# Patient Record
(demographics unavailable — no encounter records)

---

## 2025-05-05 NOTE — HISTORY OF PRESENT ILLNESS
[FreeTextEntry1] : Peter Espinoza, a 23-year-old patient, reports experiencing significant sleep disturbances for the past five to six months. He describes difficulty falling asleep, often taking one to two hours to do so. Once asleep, he wakes up after one to two hours and finds it challenging to return to sleep. These awakenings are accompanied by sensations of increased energy, preventing him from going back to sleep. He also notes frequent nocturnal awakenings where he feels his brain is awake, though not fully conscious, allowing him to return to sleep quickly. During the day, he experiences excessive daytime sleepiness, impacting his ability to nap effectively.  The patient reports snoring, though not very loud, and occasional hypnic jerks that occur almost every time he falls asleep and intermittently throughout the night. He also experiences micro-awakenings, described as moments of partial consciousness during sleep. Additionally, he has noted unplanned leg sensations at night and instances of muscle weakness when laughing or feeling excited, particularly when fatigued. He describes occasional parasomnias, including one incident where he acted out a dream by rolling out of bed.  Prior sleep medicine workup includes a home sleep study conducted on April 10, 2025, which was reported as normal, showing an apnea-hypopnea index of 2.5 and a lowest oxygen saturation of 88%. Despite normal findings, the patient continues to experience significant sleep disturbances.  Sleep Schedule: - Time to bed: Between 10 p.m. to 12 a.m. on weekends - Time to fall asleep: 1 to 2 hours - Nocturnal awakenings: Frequent - Wake up time: 6:30 a.m. to 7:30 a.m., and around 10 a.m. on weekends - Daytime sleepiness: Present  Relevant Medications: - Vyvanse (Lisdexamfetamine) for ADHD - Adderall (Amphetamine/Dextroamphetamine) for ADHD - Keppra (Levetiracetam) for seizure disorder - Fluoxetine for mental health issues - Gabapentin, initially used for anti-addiction, now for sleep aid  Past Medical History: - Scoliosis - Seizure disorder with two seizures reported in January 2022 and March/April 2023 - ADHD  Social History: The patient reports smoking cigarettes, about a quarter of a pack per day, having started smoking cigarettes at age 20 after initially vaping at 15 or 16 years old. He quit marijuana three years ago.  Review of Systems: - Neurological: History of seizures, hypnic jerks - Respiratory: Snoring, no excessive cough or dyspnea - Musculoskeletal: Occasional muscle weakness when laughing or excited - Psychiatric: Anxiety associated with sleep, ADHD - Rest of the review of systems reports not having significant issues.  Relevant Data: - April 10, 2025: Home sleep study with total sleep time of 426 minutes out of 486 minutes, AHI of 2.5, and lowest oxygen saturation of 88%. - Pulmonary function tests: FEV1 81% predicted, FEV1/FVC 99% predicted, TLC 79% predicted, DLCO 74%, corrected 99% predicted. No significant bronchodilator response. Interpretation: Essentially normal PFT

## 2025-05-05 NOTE — REASON FOR VISIT
[Consultation] : a consultation visit [FreeTextEntry1] : PCP: Mari Muhammad MD [FreeTextEntry2] : sleep disturbance

## 2025-05-05 NOTE — PLAN
[TextEntry] : The patient is a 23-year-old male with a past medical history of seizure disorder, ADHD, and scoliosis, presenting for evaluation of sleep disturbances.  - Insomnia and Sleep Disturbance: Likely multifactorial - role of medications (Vyvanse, Adderall, Gabapentin) underlying anxiety, & psychophysiologic insomnia. Discussed cognitive behavioral therapy (CBT) for insomnia. Recommended reducing stimulant medication use in the evening and emphasized psychotherapy over medication for sleep improvement. - Referral to psychotherapist, specifically for CBT for insomnia. - Consideration of in-person psychotherapy or CBTI  juan [facility].   - Hypnic jerks - normal variant. avoid sleep deprivation  - Obstructive Sleep Apnea: Despite normal home sleep study, symptoms suggest risk for sleep apnea. Advised attended sleep study for further evaluation.  - Smoking: Patient encouraged to quit smoking with resources provided for support. counselling done 6 minutes.   Follow-up: Follow up in 1 to 2 months after attended sleep study and initiation of psychotherapy.

## 2025-05-31 NOTE — HISTORY OF PRESENT ILLNESS
[FreeTextEntry1] : 23 year old male comes in because he injured his toe five days ago on right great toe. new patient pain 8/10  constant  radiates into other toes and down his foot  pins and needles  and a tearing pain  swollen and red  when he takes showers his right foot gets swollen red and hot  he had spinal surgery in 2019 states he noted occasional numbness tingling RLE after

## 2025-05-31 NOTE — PHYSICAL EXAM
[General Appearance - Alert] : alert [General Appearance - In No Acute Distress] : in no acute distress [2+] : left foot dorsalis pedis 2+ [] : normal strength/tone [Sensation] : the sensory exam was normal to light touch and pinprick [Oriented To Time, Place, And Person] : oriented to person, place, and time [Impaired Insight] : insight and judgment were intact [de-identified] : Localized erythema edema increased warm to right hallux IPJ. there is tenderness on range of motion of the IPJ. There is tenderness to palpation of interphalangeal joint of right hallux +Tinnel sign

## 2025-05-31 NOTE — PHYSICAL EXAM
[General Appearance - Alert] : alert [General Appearance - In No Acute Distress] : in no acute distress [2+] : left foot dorsalis pedis 2+ [] : normal strength/tone [Oriented To Time, Place, And Person] : oriented to person, place, and time [Sensation] : the sensory exam was normal to light touch and pinprick [Impaired Insight] : insight and judgment were intact [de-identified] : Localized erythema edema increased warm to right hallux IPJ. there is tenderness on range of motion of the IPJ. There is tenderness to palpation of interphalangeal joint of right hallux +Tinnel sign

## 2025-05-31 NOTE — ASSESSMENT
[FreeTextEntry1] : Patient seen and examined Has two complaints radiating foot pain as well as isolated pain to right IPJ Right foot xrays obtained: reveal questionable fracture distal phalanx of right hallux distal tuft versus old fracture Discussed differential diagnosis such as gout flare, bursitis, fracture  After further discussion it was agreed to give the patient an injection to reduce inflammation and associated pain and to improve function. The area to be injected was wiped thoroughly with sterile alcohol. Then using a combination of 1.0 cc of Celestone acetate suspension combined with 1 cc of Dexamethasone sodium phosphate and 1.0 cc of Lidocaine plain, an injection was given at the right hallux IPJ Sterile gauze was used for compression and then a sterile Band-Aid was applied to the injection site. The possibility of a flair reaction was discussed with the patient prior to administering the injection Discussed treatment for nerve type pain On gabapentin 300mg advised to continue discussed risks/benefits Discussed re-evaluation from orthospine possible back etiology We discussed possible posterior tibial nerve block -> defer Continue supportive shoegear Consider  He is to follow up in 2-3 weeks for re-evaluation

## 2025-06-11 NOTE — HISTORY OF PRESENT ILLNESS
[FreeTextEntry1] : 23-year-old M presents for a follow up visit of his right great toe, pt states it's doing better but continues to cause him pain at times. Flare ups occur more often in the mornings and continue throughout the rest of the day. Pain is 6/10. Pt states it continues to be swollen/ red. Pt also states pain radiates from great toe to the middle of his foot.  Pt has been elevating foot.

## 2025-06-11 NOTE — ASSESSMENT
[FreeTextEntry1] : Patient seen and examined Right IPJ localized erythema pain at IPJ Right foot xrays obtained: reveal questionable osteochondroma distal tuft medial aspect and questionable old fracture  Discussed differential diagnosis such as gout flare, bursitis, fracture  s/p 1 injection partial relief  After further discussion it was agreed to give the patient an injection to reduce inflammation and associated pain and to improve function. The area to be injected was wiped thoroughly with sterile alcohol. Then using a combination of 1.0 cc of Celestone acetate suspension combined with 1 cc of Dexamethasone sodium phosphate and 1.0 cc of Lidocaine plain, an injection was given at the right hallux IPJ Sterile gauze was used for compression and then a sterile Band-Aid was applied to the injection site. The possibility of a flair reaction was discussed with the patient prior to administering the injection Rx medrol dose pack Discussed treatment for nerve type pain On gabapentin 300mg advised to continue discussed risks/benefits Discussed re-evaluation from orthospine possible back etiology We discussed possible posterior tibial nerve block -> defer Continue supportive shoegear Discussed obtaining MRI next visit if not resolved to evaluate the distal phalanx and IPJ  He is to follow up in 2-3 weeks for re-evaluation

## 2025-06-11 NOTE — PHYSICAL EXAM
[General Appearance - Alert] : alert [General Appearance - In No Acute Distress] : in no acute distress [2+] : left foot dorsalis pedis 2+ [] : normal strength/tone [de-identified] : Localized erythema edema increased warm to right hallux IPJ. there is tenderness on range of motion of the IPJ. There is tenderness to palpation of interphalangeal joint of right hallux Persistent since last visit with some improvement  [Sensation] : the sensory exam was normal to light touch and pinprick [Impaired Insight] : insight and judgment were intact [Oriented To Time, Place, And Person] : oriented to person, place, and time

## 2025-06-25 NOTE — HISTORY OF PRESENT ILLNESS
[FreeTextEntry1] : 24 year old male presents for follow up right hallux pain and swelling. he received injection to area with partial relief. he did not  previously prescribed medrol dose pack. he denies any pain to area. States it has vastly improved since prior encounter

## 2025-06-25 NOTE — PHYSICAL EXAM
[General Appearance - Alert] : alert [General Appearance - In No Acute Distress] : in no acute distress [2+] : left foot dorsalis pedis 2+ [] : normal strength/tone [de-identified] : Localized erythema edema increased warm to right hallux IP RESOLVED. there is no longer tenderness on range of motion of the IPJ.  [Sensation] : the sensory exam was normal to light touch and pinprick [Oriented To Time, Place, And Person] : oriented to person, place, and time [Impaired Insight] : insight and judgment were intact

## 2025-06-25 NOTE — ASSESSMENT
[FreeTextEntry1] : Patient seen and examined Pain resolved Right hallux IPJ localized erythema pain at IPJ resolved Right foot xrays obtained: reveal questionable osteochondroma distal tuft medial aspect and questionable old fracture of distal phalanx Discussed differential diagnosis such as gout flare, bursitis, fracture, systomatic osteochondroma s/p 1 injection Right hallux IPJ  Discussed treatment for nerve type pain Discussed re-evaluation from orthospine possible back etiology We discussed possible posterior tibial nerve block -> defer Continue supportive shoegear I ordered right foot MRI evaluate IPJ. to investigate possible osseous lesion of right hallux  He is to follow up after MRI

## 2025-06-25 NOTE — PHYSICAL EXAM
[General Appearance - Alert] : alert [General Appearance - In No Acute Distress] : in no acute distress [2+] : left foot dorsalis pedis 2+ [] : normal strength/tone [de-identified] : Localized erythema edema increased warm to right hallux IP RESOLVED. there is no longer tenderness on range of motion of the IPJ.  [Sensation] : the sensory exam was normal to light touch and pinprick [Oriented To Time, Place, And Person] : oriented to person, place, and time [Impaired Insight] : insight and judgment were intact

## 2025-06-25 NOTE — PHYSICAL EXAM
[General Appearance - Alert] : alert [General Appearance - In No Acute Distress] : in no acute distress [2+] : left foot dorsalis pedis 2+ [] : normal strength/tone [de-identified] : Localized erythema edema increased warm to right hallux IP RESOLVED. there is no longer tenderness on range of motion of the IPJ.  [Sensation] : the sensory exam was normal to light touch and pinprick [Oriented To Time, Place, And Person] : oriented to person, place, and time [Impaired Insight] : insight and judgment were intact

## 2025-06-25 NOTE — ASSESSMENT
[FreeTextEntry1] : Patient seen and examined Pain resolved Right hallux IPJ localized erythema pain at IPJ resolved Right foot xrays obtained: reveal questionable osteochondroma distal tuft medial aspect and questionable old fracture of distal phalanx Discussed differential diagnosis such as gout flare, bursitis, fracture, systomatic osteochondroma s/p 1 injection Right hallux IPJ  Discussed treatment for nerve type pain Discussed re-evaluation from orthospine possible back etiology We discussed possible posterior tibial nerve block -> defer Continue supportive shoegear  at patient request I ordered right foot MRI evaluate IPJ. to investigate possible osseous lesion of right hallux  He is to follow up after MRI